# Patient Record
Sex: FEMALE | URBAN - METROPOLITAN AREA
[De-identification: names, ages, dates, MRNs, and addresses within clinical notes are randomized per-mention and may not be internally consistent; named-entity substitution may affect disease eponyms.]

---

## 2017-01-05 ENCOUNTER — THERAPY VISIT (OUTPATIENT)
Dept: OCCUPATIONAL THERAPY | Facility: CLINIC | Age: 32
End: 2017-01-05
Payer: COMMERCIAL

## 2017-01-05 DIAGNOSIS — G56.03 BILATERAL CARPAL TUNNEL SYNDROME: Primary | ICD-10-CM

## 2017-01-05 DIAGNOSIS — M25.532 PAIN IN BOTH WRISTS: ICD-10-CM

## 2017-01-05 DIAGNOSIS — M25.531 PAIN IN BOTH WRISTS: ICD-10-CM

## 2017-01-05 PROCEDURE — 97112 NEUROMUSCULAR REEDUCATION: CPT | Mod: GO | Performed by: OCCUPATIONAL THERAPIST

## 2017-01-05 PROCEDURE — 97026 INFRARED THERAPY: CPT | Mod: GO | Performed by: OCCUPATIONAL THERAPIST

## 2017-01-05 PROCEDURE — 97140 MANUAL THERAPY 1/> REGIONS: CPT | Mod: GO | Performed by: OCCUPATIONAL THERAPIST

## 2017-01-05 NOTE — PROGRESS NOTES
SOAP note objective information for 1/5/2017    Please refer to the daily flowsheet for treatment today, total treatment time and time spent performing 1:1 timed codes.         Pain Level Report  VAS(0-10) 8/4/2016 9/15 9/29/16 10/27/16 11/3/16 11/8/16 11/25/16 11/29/16 12/6/2016    R wrist              At Rest: 8 6/10 12/10 5 2 3 3 3 4    With Use: 10++ 8/10 12/10 5 2 4 4 4 5    L wrist              At rest  8 5 8/10 0 1 1 1 1 2    With use  10 6 8/10 0 1 1-2 1-2 2 3      VAS(0-10) 12/13/16 12/23/16 12/30/16 1/5/2017        R wrist             At Rest: 5 4 5 5        With Use: 6 5 7 7-8        L wrist             At rest  3 2-3 3 3        With use  4 3-4 4 4            Report of Pain:  Location: R and L wrist  Pain Quality:  More on Right  Frequency: intermittent or constant    Pain is worst:  Daytime, has a a hard time falling asleep   Exacerbated by:  Typing and use and mousing   Relieved by:  cold, heat, rest and contrast baths   Progression: better, but slight exacerbation  Edema:  NONE (just feels Like there is and alien in my hand trying to get out)  Sensation:  Brief paraesthesias in tips of B ring fingers for one day, that resolved the same day.    ROM: WNL bilateral hands     Special Tests:  Date 8/4/2016 9/15 10/27/16 11/25/2016 12/23/16   Side        Phalens R: ++++, L + R ++++, L + NT     Tinels at CT R -, L - R-, L- NT     Tinels at Pronator R - L - R-, L- NT     Median Nerve ULTT R -, L - R-. : - NT     Paresthesias Yes in palm only R and L hands  In B palm and wrist only not fingers  None at this time     Jona   R: +  L:+ R: + at 10 sec  L:+ at 10 sec R: + at 20 sec  L:+ at 20 sec   Costoclavicular   R: +  L:+ R: +  L:+ R: +  L:+   Elbow flexion test   R: +  L:+ R: +  L:+ R: +  L:+               Strength:   (Measured in pounds)    8/4/2016 11/3/16      Trials Left  Right Left Right Left Right   1  2  3 15  15  20 10  10  7       Average: 17 9 15# 10#       Lat Pinch  8/4/2016 11/3/16      Trials  Left  Right  Left Right Left Right   1  2  3 5  5  5 6  6  6       Average: 5 6 4# 4#       3 Pt Pinch  8/4/2016 11/3/16      Trials Left  Right  Left Right Left Right   1  2  3 2  2  2 0  0  0       Average: 2 0 2# 2#       Posture: has forward head rounded shoulder posture       Home Exercise Program:  PTrx tendon and nerve gliding for CTS 3 times per day 10 reps  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 We discussed asking Dr. De Luna for complete typing and mousing restrictions, however patient wants to opt for waiting after her 2 week vacation which starts 7 work days from now before requesting the greater restrictions)  Went over anatomy and precautions in depth with patient   Patient has purchased her own night wrist splints   Patient is trying Rolfing on her own .   9/29/2016  Lower Keyboard so elbows are in line with keyboard not below the keyboard   Add upper, middle, lower trap strengthening   Towel roll stretches   10/27/2016  Foam roller  Small ball volar and dorsal fa and bicep/pronator  1st rib/sheet depression  1st rib trigger with tennis ball  Tennis ball to muscles under clavicle  Latissimus stretch  Pec stretch  Bridging #1  [place] massage CT/thenar;volar hand  Clip web/Double pencil eraser to web space  Shoulder blade squeezes  K-tape    Next Visit:  subscap stretch  Laser  MFR  Nerve gliding

## 2017-01-10 ENCOUNTER — OFFICE VISIT (OUTPATIENT)
Dept: FAMILY MEDICINE | Facility: CLINIC | Age: 32
End: 2017-01-10
Payer: COMMERCIAL

## 2017-01-10 ENCOUNTER — OFFICE VISIT (OUTPATIENT)
Dept: ORTHOPEDICS | Facility: CLINIC | Age: 32
End: 2017-01-10

## 2017-01-10 VITALS
DIASTOLIC BLOOD PRESSURE: 58 MMHG | BODY MASS INDEX: 19.99 KG/M2 | TEMPERATURE: 98 F | WEIGHT: 120 LBS | HEIGHT: 65 IN | OXYGEN SATURATION: 96 % | HEART RATE: 83 BPM | SYSTOLIC BLOOD PRESSURE: 93 MMHG

## 2017-01-10 VITALS — HEIGHT: 65 IN | WEIGHT: 117 LBS | BODY MASS INDEX: 19.49 KG/M2

## 2017-01-10 DIAGNOSIS — M25.511 CHRONIC PAIN OF BOTH SHOULDERS: ICD-10-CM

## 2017-01-10 DIAGNOSIS — M79.601 PAIN IN BOTH UPPER EXTREMITIES: ICD-10-CM

## 2017-01-10 DIAGNOSIS — M25.512 CHRONIC PAIN OF BOTH SHOULDERS: ICD-10-CM

## 2017-01-10 DIAGNOSIS — G89.29 CHRONIC PAIN OF BOTH SHOULDERS: ICD-10-CM
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
## 2017-01-10 DIAGNOSIS — Z00.00 ROUTINE GENERAL MEDICAL EXAMINATION AT A HEALTH CARE FACILITY: Primary | ICD-10-CM

## 2017-01-10 DIAGNOSIS — M79.602 PAIN IN BOTH UPPER EXTREMITIES: ICD-10-CM

## 2017-01-10 PROCEDURE — 82947 ASSAY GLUCOSE BLOOD QUANT: CPT | Performed by: INTERNAL MEDICINE

## 2017-01-10 PROCEDURE — 99385 PREV VISIT NEW AGE 18-39: CPT | Performed by: INTERNAL MEDICINE

## 2017-01-10 PROCEDURE — 80061 LIPID PANEL: CPT | Performed by: INTERNAL MEDICINE

## 2017-01-10 PROCEDURE — 36415 COLL VENOUS BLD VENIPUNCTURE: CPT | Performed by: INTERNAL MEDICINE

## 2017-01-10 RX ORDER — CHLORAL HYDRATE 500 MG
2 CAPSULE ORAL DAILY
COMMUNITY

## 2017-01-10 NOTE — PROGRESS NOTES
"Sports Medicine Clinic Visit    PCP: No primary care provider on file.    Fahad Joshua is a 32 year old female who is seen  in follow-up presenting with bilateral upper extremity pain.       Ht 5' 4.5\" (1.638 m)  Wt 117 lb (53.071 kg)  BMI 19.78 kg/m2       PMH:  No past medical history on file.    Active problem list:  Patient Active Problem List   Diagnosis     Bilateral carpal tunnel syndrome     Pain in both wrists       FH:  No family history on file.    SH:  Social History     Social History     Marital Status: Single     Spouse Name: N/A     Number of Children: N/A     Years of Education: N/A     Occupational History     Not on file.     Social History Main Topics     Smoking status: Not on file     Smokeless tobacco: Not on file     Alcohol Use: Not on file     Drug Use: Not on file     Sexual Activity: Not on file     Other Topics Concern     Not on file     Social History Narrative     No narrative on file       MEDS:  See EMR, reviewed  ALL:  See EMR, reviewed    REVIEW OF SYSTEMS:  CONSTITUTIONAL:NEGATIVE for fever, chills, change in weight  INTEGUMENTARY/SKIN: NEGATIVE for worrisome rashes, moles or lesions  EYES: NEGATIVE for vision changes or irritation  ENT/MOUTH: NEGATIVE for ear, mouth and throat problems  RESP:NEGATIVE for significant cough or SOB  BREAST: NEGATIVE for masses, tenderness or discharge  CV: NEGATIVE for chest pain, palpitations or peripheral edema  GI: NEGATIVE for nausea, abdominal pain, heartburn, or change in bowel habits  :NEGATIVE for frequency, dysuria, or hematuria  :NEGATIVE for frequency, dysuria, or hematuria  NEURO: NEGATIVE for weakness, dizziness or paresthesias  ENDOCRINE: NEGATIVE for temperature intolerance, skin/hair changes  HEME/ALLERGY/IMMUNE: NEGATIVE for bleeding problems  PSYCHIATRIC: NEGATIVE for changes in mood or affect          EXAM: MRI OF THE RIGHT SHOULDER  CLINICAL INFORMATION: The patient is a 31-year-old female with right shoulder pain. " Evaluate acromioclavicular joint. Evaluate for tear, degeneration, or acromioclavicular separation.  PRIOR SURGERY: None reported.  COMPARISON STUDIES: There are no prior studies available for comparison.  TECHNICAL INFORMATION: 1.5T MR scanner and a localizing shoulder surface coil:  3.0 mm coronal obliques: PD, T2, STIR  3.0 mm sagittal obliques: PD, T2  3.0 mm axials: PD, T2  FINDINGS:  Articular/Extraarticular collections:  Effusion: Minimal.  Subacromial/subdeltoid: Mild fluid is seen within the subacromial/subdeltoid bursa, in keeping with mild bursitis.  Subcoracoid: No evidence for bursitis.  Osseous structures:  Proximal humerus: No evidence for bony injury to the proximal humerus can be seen. There is no evidence for greater tuberosity fracture. No Hill-Sachs or reverse Hill-Sachs deformity is seen.  Glenoid: No acute bony abnormality of the glenoid fossa or glenoid neck can be seen.  Acromioclavicular joint: No evidence for injury to the acromioclavicular joint can be seen.  Coracoacromial arch:  Acromion morphology: Type II. No evidence for os acromiale.  Acromiohumeral space: At the lower limits of normal.  Coracohumeral space: Within normal limits.  Rotator cuff and deltoid:  Supraspinatus: Mild changes of supraspinatus tendinosis are present. There is no evidence for full or partial-thickness tearing. No atrophic changes of the supraspinatus muscle belly are seen.  Infraspinatus: Mild infraspinatus tendinosis can be seen without evidence for full or partial-thickness tearing. No atrophic changes of the infraspinatus muscle belly are seen.  Teres minor: No evidence for tendinosis, tearing, or associated muscle belly atrophy.  Subscapularis: No evidence for tendinosis, tearing, or associated muscle belly atrophy.  Deltoid: No evidence for strain or tearing.  Biceps tendon: The intra-articular and biceps sulcus portions of the biceps tendon are normal. There is no evidence for rupture, dislocation, or  subluxation.  Glenohumeral joint and labrum:  Articular Cartilage: No chondral injuries along the articular surfaces of the glenohumeral articulation can be seen. No osteoarthritic changes are identified.  Labrum: The anterior, posterior, superior, and inferior portions of the labrum appear intact. No evidence for paralabral ganglion cyst formation can be seen.  Capsular Soft Tissues: No definite capsular abnormalities of the glenohumeral joint are seen. No evidence for capsular tearing is present and there are no MR signs of adhesive capsulitis.  CONCLUSION:  1. Mild supraspinatus and infraspinatus tendinosis can be seen. There is no evidence for full or partial-thickness rotator cuff tearing.  2. No injuries to the acromioclavicular joint are present.  3. Mild subacromial/subdeltoid bursitis and minimal glenohumeral joint effusion.  4. The glenoid labrum and long head of the biceps tendon appear intact.  5. No osteoarthritic changes of the glenohumeral articulation are seen.  [facility]      Electronically signed on 12/30/2016 7:05:00 AM by Amaury Sanchez M.D.         EXAM: MRI OF THE LEFT SHOULDER  CLINICAL INFORMATION: The patient is a 31-year-old female with left shoulder pain. Evaluate for acromioclavicular joint abnormality. Evaluate for tear, degeneration, or acromioclavicular separation injury.  PRIOR SURGERY: None reported.  COMPARISON STUDIES: There are no prior studies available for comparison.  TECHNICAL INFORMATION: 1.5T MR scanner and a localizing shoulder surface coil:  3.0 mm coronal obliques: PD, T2, STIR  3.0 mm sagittal obliques: PD, T2  3.0 mm axials: PD, T2  FINDINGS:  Articular/Extraarticular collections:  Effusion: None.  Subacromial/subdeltoid: Minimal fluid is seen within the subacromial/subdeltoid bursa.  Subcoracoid: No evidence for bursitis.  Osseous structures:  Proximal humerus: No evidence for bony injury to the proximal humerus can be seen. There is no evidence for greater tuberosity  fracture. No Hill-Sachs or reverse Hill-Sachs deformity is seen.  Glenoid: No acute bony abnormality of the glenoid fossa or glenoid neck can be seen.  Acromioclavicular joint: No evidence for injury to the acromioclavicular joint can be seen.  Coracoacromial arch:  Acromion morphology: Type I. No evidence for os acromiale.  Acromiohumeral space: Mildly narrowed.  Coracohumeral space: Within normal limits.  Rotator cuff and deltoid:  Supraspinatus: No evidence for tendinosis, tearing, or associated muscle belly atrophy.  Infraspinatus: No evidence for tendinosis, tearing, or associated muscle belly atrophy.  Teres minor: No evidence for tendinosis, tearing, or associated muscle belly atrophy.  Subscapularis: No evidence for tendinosis, tearing, or associated muscle belly atrophy.  Deltoid: No evidence for strain or tearing.  Biceps tendon: The intra-articular and biceps sulcus portions of the biceps tendon are normal. There is no evidence for rupture, dislocation, or subluxation.  Glenohumeral joint and labrum:  Articular Cartilage: No chondral injuries along the articular surfaces of the glenohumeral articulation can be seen. No osteoarthritic changes are identified.  Labrum: The anterior, posterior, superior, and inferior portions of the labrum appear intact. No evidence for paralabral ganglion cyst formation can be seen.  Capsular Soft Tissues: No definite capsular abnormalities of the glenohumeral joint are seen. No evidence for capsular tearing is present and there are no MR signs of adhesive capsulitis.  CONCLUSION:  1. No evidence for injury to the acromioclavicular joint can be seen.  2. No injuries to the rotator cuff are identified.  3. Minimal subacromial/subdeltoid bursal fluid.  4. The glenoid labrum and long head of the biceps tendon appear intact.  5. No osteoarthritic changes of the glenohumeral articulation are seen.  [facility]      Electronically signed on 12/30/2016 7:07:00 AM by Amaury Sanchez  M.D.         EXAM: MR CERVICAL SPINE WITHOUT CONTRAST WITH EXTENSION  CLINICAL INFORMATION: Neck pain into the arms and hands for 5 months.  COMPARISON: None  TECHNICAL INFORMATION: This examination was performed on the 0.63T Open Upright MRI with neutral and extension sagittal T2 FSE, neutral axial MPGR and FSE T2, and neutral T1 sagittal images.  INTERPRETATION:  Fat suppressed images are negative for acute and subacute fractures. The cord is intrinsically normal. No high signal alteration or mass and craniovertebral junction structures are unremarkable. Positive vertebral artery flow.  C2-3: 1 mm retrolisthesis without stenosis or neural impingement. Facet joints are normal.  C3-4 and C4-5: 1 mm retrolisthesis, C3-4 annular fissure, no herniation or spinal stenosis. Facet joints are unremarkable.  C5-6: Disc bulge without central or foraminal stenosis. No herniation or facet arthropathy. Facet joints are unremarkable.  The C6-7, C7-T1 and T1-2 levels are negative for bulge, herniation or stenosis.  CONCLUSION:  1. No disc herniation, stenosis or neural compression.  2. C4-5 annular fissure and 1 mm retrolisthesis, and C5-6 disc bulge.  3. No fracture, infection or neoplasm.      Electronically signed on 11/12/2016 9:46:00 AM by Tani Ann M.D.       SUBJECTIVE:  This 32-year-old female is seen in followup for bilateral upper extremity pain.  Since last visit she has seen physicians and providers in other health systems.  She continues to get ongoing care from a physical therapist that she finds helpful.  She is making slow improvements and the physical therapist is hoping that she can make significant improvements within the next 2 months.  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  She will feel stress and tension in her upper back and towards her neck.  She has seen some relief with manipulations with chiropractor.  She has had a number of studies done by additional physicians including an MRI of the neck, an MRI of both shoulders, x-rays of the bilateral shoulders, x-rays of the bilateral wrists, x-rays of the thoracic spine and lumbar spine and pelvis since my last visit with her.  She has seen a rheumatologist.        LABORATORY:  X-rays of her bilateral shoulders are reviewed and I do not see any bony abnormalities.  The MRI reports of her cervical spine and shoulders are reviewed and there are no significant abnormalities.  She had some minor signs of tendinitis about the shoulders but without any findings that would explain her persistent upper extremity pain.  The MRI of the cervical spine showed no signs of impingement or stenosis, and this is consistent with the previous normal EMG that she had of her bilateral upper extremities.        She was told by her chiropractor that she should have a workup for thoracic outlet syndrome.  There are many parts of her story that do not sound like thoracic outlet.  It is also interesting that when she took 2 weeks off from work her symptoms disappeared completely.  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  She denies symptoms of depression and anxiety although we did go over these today.  She has tried to institute meditation and massage, in addition to the chiropractor care and physical therapy that she obtains.      OBJECTIVE:  She has no impingement signs at either shoulder today.   I do not palpate any masses about the clavicle.   I do not palpate any thrills about the clavicle.   A Jona test is essentially negative producing some tension that she feels in the bilateral shoulders although she will get  a tingling sensation at about a minute in the bilateral hands.  Adson maneuver is negative bilaterally.      ASSESSMENT:  Repetitive use syndrome of the bilateral upper extremities.      PLAN:  I reassured her about the findings of these multiple studies and also the findings of the rheumatologist that she saw who did not find, through a series of blood tests, diagnosis of underlying arthritis or other issues.   At the beginning of these symptoms she expressed her wish that she could find another part of her job that did not require so much repetitive typing and time at the keyboard, and I encouraged her to continue to think about exploring this.   She and her physical therapist have discussed this and they decided that it would be best for her to continue with the 4-hour shift at work and she believes she can handle this, and so a work note was given regarding this, limiting her shift to 4 hours over the next 4 weeks and avoiding excessive gripping and grasping and lifting greater than 20 pounds.  Will complete the workup with an x-ray to look for cervical rib and a dynamic ultrasound that is positional of the upper extremities to rule out a vascular variant of thoracic outlet, although I told her I think this diagnosis seems unlikely in this setting.  If this comes back normal, she would have had a complete MRI, x-ray and vascular workup and other than ongoing physical therapy and work modification and retraining, I would not have other studies that I would order in this setting.  She will follow up with me in 4 weeks.

## 2017-01-10 NOTE — Clinical Note
"  1/10/2017      RE: Fahad Joshua  [address] [place] [address]       Sports Medicine Clinic Visit    PCP: No primary care provider on file.    Fahad Joshua is a 32 year old female who is seen  in follow-up presenting with bilateral upper extremity pain.       Ht 5' 4.5\" (1.638 m)  Wt 117 lb (53.071 kg)  BMI 19.78 kg/m2       PMH:  No past medical history on file.    Active problem list:  Patient Active Problem List   Diagnosis     Bilateral carpal tunnel syndrome     Pain in both wrists       FH:  No family history on file.    SH:  Social History     Social History     Marital Status: Single     Spouse Name: N/A     Number of Children: N/A     Years of Education: N/A     Occupational History     Not on file.     Social History Main Topics     Smoking status: Not on file     Smokeless tobacco: Not on file     Alcohol Use: Not on file     Drug Use: Not on file     Sexual Activity: Not on file     Other Topics Concern     Not on file     Social History Narrative     No narrative on file       MEDS:  See EMR, reviewed  ALL:  See EMR, reviewed    REVIEW OF SYSTEMS:  CONSTITUTIONAL:NEGATIVE for fever, chills, change in weight  INTEGUMENTARY/SKIN: NEGATIVE for worrisome rashes, moles or lesions  EYES: NEGATIVE for vision changes or irritation  ENT/MOUTH: NEGATIVE for ear, mouth and throat problems  RESP:NEGATIVE for significant cough or SOB  BREAST: NEGATIVE for masses, tenderness or discharge  CV: NEGATIVE for chest pain, palpitations or peripheral edema  GI: NEGATIVE for nausea, abdominal pain, heartburn, or change in bowel habits  :NEGATIVE for frequency, dysuria, or hematuria  :NEGATIVE for frequency, dysuria, or hematuria  NEURO: NEGATIVE for weakness, dizziness or paresthesias  ENDOCRINE: NEGATIVE for temperature intolerance, skin/hair changes  HEME/ALLERGY/IMMUNE: NEGATIVE for bleeding problems  PSYCHIATRIC: NEGATIVE for changes in mood or affect          EXAM: MRI OF THE RIGHT SHOULDER  CLINICAL " INFORMATION: The patient is a 31-year-old female with right shoulder pain. Evaluate acromioclavicular joint. Evaluate for tear, degeneration, or acromioclavicular separation.  PRIOR SURGERY: None reported.  COMPARISON STUDIES: There are no prior studies available for comparison.  TECHNICAL INFORMATION: 1.5T MR scanner and a localizing shoulder surface coil:  3.0 mm coronal obliques: PD, T2, STIR  3.0 mm sagittal obliques: PD, T2  3.0 mm axials: PD, T2  FINDINGS:  Articular/Extraarticular collections:  Effusion: Minimal.  Subacromial/subdeltoid: Mild fluid is seen within the subacromial/subdeltoid bursa, in keeping with mild bursitis.  Subcoracoid: No evidence for bursitis.  Osseous structures:  Proximal humerus: No evidence for bony injury to the proximal humerus can be seen. There is no evidence for greater tuberosity fracture. No Hill-Sachs or reverse Hill-Sachs deformity is seen.  Glenoid: No acute bony abnormality of the glenoid fossa or glenoid neck can be seen.  Acromioclavicular joint: No evidence for injury to the acromioclavicular joint can be seen.  Coracoacromial arch:  Acromion morphology: Type II. No evidence for os acromiale.  Acromiohumeral space: At the lower limits of normal.  Coracohumeral space: Within normal limits.  Rotator cuff and deltoid:  Supraspinatus: Mild changes of supraspinatus tendinosis are present. There is no evidence for full or partial-thickness tearing. No atrophic changes of the supraspinatus muscle belly are seen.  Infraspinatus: Mild infraspinatus tendinosis can be seen without evidence for full or partial-thickness tearing. No atrophic changes of the infraspinatus muscle belly are seen.  Teres minor: No evidence for tendinosis, tearing, or associated muscle belly atrophy.  Subscapularis: No evidence for tendinosis, tearing, or associated muscle belly atrophy.  Deltoid: No evidence for strain or tearing.  Biceps tendon: The intra-articular and biceps sulcus portions of the  biceps tendon are normal. There is no evidence for rupture, dislocation, or subluxation.  Glenohumeral joint and labrum:  Articular Cartilage: No chondral injuries along the articular surfaces of the glenohumeral articulation can be seen. No osteoarthritic changes are identified.  Labrum: The anterior, posterior, superior, and inferior portions of the labrum appear intact. No evidence for paralabral ganglion cyst formation can be seen.  Capsular Soft Tissues: No definite capsular abnormalities of the glenohumeral joint are seen. No evidence for capsular tearing is present and there are no MR signs of adhesive capsulitis.  CONCLUSION:  1. Mild supraspinatus and infraspinatus tendinosis can be seen. There is no evidence for full or partial-thickness rotator cuff tearing.  2. No injuries to the acromioclavicular joint are present.  3. Mild subacromial/subdeltoid bursitis and minimal glenohumeral joint effusion.  4. The glenoid labrum and long head of the biceps tendon appear intact.  5. No osteoarthritic changes of the glenohumeral articulation are seen.  AEC      Electronically signed on 12/30/2016 7:05:00 AM by Amaury Sanchez M.D.         EXAM: MRI OF THE LEFT SHOULDER  CLINICAL INFORMATION: The patient is a 31-year-old female with left shoulder pain. Evaluate for acromioclavicular joint abnormality. Evaluate for tear, degeneration, or acromioclavicular separation injury.  PRIOR SURGERY: None reported.  COMPARISON STUDIES: There are no prior studies available for comparison.  TECHNICAL INFORMATION: 1.5T MR scanner and a localizing shoulder surface coil:  3.0 mm coronal obliques: PD, T2, STIR  3.0 mm sagittal obliques: PD, T2  3.0 mm axials: PD, T2  FINDINGS:  Articular/Extraarticular collections:  Effusion: None.  Subacromial/subdeltoid: Minimal fluid is seen within the subacromial/subdeltoid bursa.  Subcoracoid: No evidence for bursitis.  Osseous structures:  Proximal humerus: No evidence for bony injury to  the proximal humerus can be seen. There is no evidence for greater tuberosity fracture. No Hill-Sachs or reverse Hill-Sachs deformity is seen.  Glenoid: No acute bony abnormality of the glenoid fossa or glenoid neck can be seen.  Acromioclavicular joint: No evidence for injury to the acromioclavicular joint can be seen.  Coracoacromial arch:  Acromion morphology: Type I. No evidence for os acromiale.  Acromiohumeral space: Mildly narrowed.  Coracohumeral space: Within normal limits.  Rotator cuff and deltoid:  Supraspinatus: No evidence for tendinosis, tearing, or associated muscle belly atrophy.  Infraspinatus: No evidence for tendinosis, tearing, or associated muscle belly atrophy.  Teres minor: No evidence for tendinosis, tearing, or associated muscle belly atrophy.  Subscapularis: No evidence for tendinosis, tearing, or associated muscle belly atrophy.  Deltoid: No evidence for strain or tearing.  Biceps tendon: The intra-articular and biceps sulcus portions of the biceps tendon are normal. There is no evidence for rupture, dislocation, or subluxation.  Glenohumeral joint and labrum:  Articular Cartilage: No chondral injuries along the articular surfaces of the glenohumeral articulation can be seen. No osteoarthritic changes are identified.  Labrum: The anterior, posterior, superior, and inferior portions of the labrum appear intact. No evidence for paralabral ganglion cyst formation can be seen.  Capsular Soft Tissues: No definite capsular abnormalities of the glenohumeral joint are seen. No evidence for capsular tearing is present and there are no MR signs of adhesive capsulitis.  CONCLUSION:  1. No evidence for injury to the acromioclavicular joint can be seen.  2. No injuries to the rotator cuff are identified.  3. Minimal subacromial/subdeltoid bursal fluid.  4. The glenoid labrum and long head of the biceps tendon appear intact.  5. No osteoarthritic changes of the glenohumeral articulation are  seen.  AEC      Electronically signed on 12/30/2016 7:07:00 AM by Amaury Sanchez M.D.         EXAM: MR CERVICAL SPINE WITHOUT CONTRAST WITH EXTENSION  CLINICAL INFORMATION: Neck pain into the arms and hands for 5 months.  COMPARISON: None  TECHNICAL INFORMATION: This examination was performed on the 0.63T Open Upright MRI with neutral and extension sagittal T2 FSE, neutral axial MPGR and FSE T2, and neutral T1 sagittal images.  INTERPRETATION:  Fat suppressed images are negative for acute and subacute fractures. The cord is intrinsically normal. No high signal alteration or mass and craniovertebral junction structures are unremarkable. Positive vertebral artery flow.  C2-3: 1 mm retrolisthesis without stenosis or neural impingement. Facet joints are normal.  C3-4 and C4-5: 1 mm retrolisthesis, C3-4 annular fissure, no herniation or spinal stenosis. Facet joints are unremarkable.  C5-6: Disc bulge without central or foraminal stenosis. No herniation or facet arthropathy. Facet joints are unremarkable.  The C6-7, C7-T1 and T1-2 levels are negative for bulge, herniation or stenosis.  CONCLUSION:  1. No disc herniation, stenosis or neural compression.  2. C4-5 annular fissure and 1 mm retrolisthesis, and C5-6 disc bulge.  3. No fracture, infection or neoplasm.      Electronically signed on 11/12/2016 9:46:00 AM by Tani Ann M.D.       SUBJECTIVE:  This 32-year-old female is seen in followup for bilateral upper extremity pain.  Since last visit she has seen physicians and providers in other health systems.  She continues to get ongoing care from a physical therapist that she finds helpful.  She is making slow improvements and the physical therapist is hoping that she can make significant improvements within the next 2 months.  She again notes that when she takes time off from work that her upper extremity symptoms will resolve, but when she returns to work for excessive typing and time at a desk she will have  burning pain that she feels in the bilateral upper extremities from the shoulders to the forearms, to the upper arms, even to the hands.  She will feel stress and tension in her upper back and towards her neck.  She has seen some relief with manipulations with chiropractor.  She has had a number of studies done by additional physicians including an MRI of the neck, an MRI of both shoulders, x-rays of the bilateral shoulders, x-rays of the bilateral wrists, x-rays of the thoracic spine and lumbar spine and pelvis since my last visit with her.  She has seen a rheumatologist.        LABORATORY:  X-rays of her bilateral shoulders are reviewed and I do not see any bony abnormalities.  The MRI reports of her cervical spine and shoulders are reviewed and there are no significant abnormalities.  She had some minor signs of tendinitis about the shoulders but without any findings that would explain her persistent upper extremity pain.  The MRI of the cervical spine showed no signs of impingement or stenosis, and this is consistent with the previous normal EMG that she had of her bilateral upper extremities.        She was told by her chiropractor that she should have a workup for thoracic outlet syndrome.  There are many parts of her story that do not sound like thoracic outlet.  It is also interesting that when she took 2 weeks off from work her symptoms disappeared completely.  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  She denies symptoms of depression and anxiety although we did go over these today.  She has tried to institute meditation and massage, in addition to the chiropractor care and physical therapy that she obtains.      OBJECTIVE:  She has no impingement signs at either shoulder today.   I do not palpate any masses about the clavicle.   I do not palpate any thrills about the clavicle.   A Jona test is essentially negative  producing some tension that she feels in the bilateral shoulders although she will get a tingling sensation at about a minute in the bilateral hands.  Adson maneuver is negative bilaterally.      ASSESSMENT:  Repetitive use syndrome of the bilateral upper extremities.      PLAN:  I reassured her about the findings of these multiple studies and also the findings of the rheumatologist that she saw who did not find, through a series of blood tests, diagnosis of underlying arthritis or other issues.   At the beginning of these symptoms she expressed her wish that she could find another part of her job that did not require so much repetitive typing and time at the keyboard, and I encouraged her to continue to think about exploring this.   She and her physical therapist have discussed this and they decided that it would be best for her to continue with the 4-hour shift at work and she believes she can handle this, and so a work note was given regarding this, limiting her shift to 4 hours over the next 4 weeks and avoiding excessive gripping and grasping and lifting greater than 20 pounds.  Will complete the workup with an x-ray to look for cervical rib and a dynamic ultrasound that is positional of the upper extremities to rule out a vascular variant of thoracic outlet, although I told her I think this diagnosis seems unlikely in this setting.  If this comes back normal, she would have had a complete MRI, x-ray and vascular workup and other than ongoing physical therapy and work modification and retraining, I would not have other studies that I would order in this setting.  She will follow up with me in 4 weeks.                             Rogers De Luna MD

## 2017-01-10 NOTE — MR AVS SNAPSHOT
After Visit Summary   1/10/2017    Fhaad Joshua    MRN: [id number]           Patient Information     Date Of Birth          1985        Visit Information        Provider Department      1/10/2017 6:30 PM Naldo Narvaez MD [facility]        Today's Diagnoses     Routine general medical examination at a health care facility    -  1     Chronic pain of both shoulders           Care Instructions      Preventive Health Recommendations  Female Ages 26 - 39  Yearly exam:   See your health care provider every year in order to    Review health changes.     Discuss preventive care.      Review your medicines if you your doctor has prescribed any.    Until age 30: Get a Pap test every three years (more often if you have had an abnormal result).    After age 30: Talk to your doctor about whether you should have a Pap test every 3 years or have a Pap test with HPV screening every 5 years.   You do not need a Pap test if your uterus was removed (hysterectomy) and you have not had cancer.  You should be tested each year for STDs (sexually transmitted diseases), if you're at risk.   Talk to your provider about how often to have your cholesterol checked.  If you are at risk for diabetes, you should have a diabetes test (fasting glucose).  Shots: Get a flu shot each year. Get a tetanus shot every 10 years.   Nutrition:     Eat at least 5 servings of fruits and vegetables each day.    Eat whole-grain bread, whole-wheat pasta and brown rice instead of white grains and rice.    Talk to your provider about Calcium and Vitamin D.     Lifestyle    Exercise at least 150 minutes a week (30 minutes a day, 5 days of the week). This will help you control your weight and prevent disease.    Limit alcohol to one drink per day.    No smoking.     Wear sunscreen to prevent skin cancer.    See your dentist every six months for an exam and cleaning.      (Z00.00) Routine general medical examination at a health  care facility  (primary encounter diagnosis)  Comment: For routine exam, we will draw labs as ordered, cholesterol, diabetes mellitus check,  We will also update vaccination history.  Plan: Glucose, Lipid panel reflex to direct LDL            (M25.512,  G89.29,  M25.511) Chronic pain of both shoulders  Comment: Plan to follow up in orthopedics, rheumatology, physical therapy, chiropractor, massage therapy, yoga, acupuncture.  Plan:             Follow-ups after your visit        Your next 10 appointments already scheduled     Jan 11, 2017  9:50 AM   PHYSICAL with Medina Padgett MD   [facility] [place] ([facility])    [address]  [facility] [address]   [phone]            Jan 12, 2017  8:30 AM   SMILEY Hand with Cydney Perla   [facility]    [address]  [facility] [address]   [phone]            Jan 19, 2017  7:30 AM   SMILEY Hand with Cydney Perla   [facility] ([place] [facility])    [address]  [facility] [address]   [phone]            Jan 26, 2017  3:00 PM   US UPPER EXTREMITY ARTERIAL THORACIC OUTLET SYNDROME with [id number]   [facility] ([facility])    [address]  1st Floor  [facility] [id number] [phone]           Please bring a list of your medicines (including vitamins, minerals and over-the-counter drugs). Also, tell your doctor about any allergies you may have. Wear comfortable clothes and leave your valuables at home.  You do not need to do anything special to prepare for your exam.  Please call the Imaging Department at your exam site with any questions.            Jan 26, 2017  4:00 PM   XR CHEST 2 VIEWS with [id number]   [facility] ([facility])    [address]  4th Floor  [facility] [place] [facility] [address] [phone]           Please bring a list of your  current medicines to your exam. (Include vitamins, minerals and over-thecounter medicines.) Leave your valuables at home.  Tell your doctor if there is a chance you may be pregnant.  You do not need to do anything special for this exam.            Feb 09, 2017  4:30 PM   (Arrive by 4:15 PM)   Return Visit with Rogers De Luna MD   [facility] ([facility])    [address]  [address] [facility] [id number] [phone]              Future tests that were ordered for you today     Open Future Orders        Priority Expected Expires Ordered    US Up Ext Arterial Thor Outlet Syn Routine  1/10/2018 1/10/2017    X-ray Chest 2 vws* Routine 1/10/2017 1/10/2018 1/10/2017            Who to contact     If you have questions or need follow up information about today's clinic visit or your schedule please contact [facility] directly at [phone].  Normal or non-critical lab and imaging results will be communicated to you by Remedifyhart, letter or phone within 4 business days after the clinic has received the results. If you do not hear from us within 7 days, please contact the clinic through [employer] or phone. If you have a critical or abnormal lab result, we will notify you by phone as soon as possible.  Submit refill requests through [employer]OTMP or call your pharmacy and they will forward the refill request to us. Please allow 3 business days for your refill to be completed.          Additional Information About Your Visit        Remed[employer]force Information     [employer] gives you secure access to your electronic health record. If you see a primary care provider, you can also send messages to your care team and make appointments. If you have questions, please call your primary care clinic.  If you do not have a primary care provider, please call [phone] and they will assist you.        Care EveryWhere ID     This is your Care EveryWhere ID. This could be used by other  "organizations to access your [place] medical records  [id number]        Your Vitals Were     Pulse Temperature Height BMI (Body Mass Index) Pulse Oximetry Last Period    83 98  F (36.7  C) 5' 4.5\" (1.638 m) 20.29 kg/m2 96% 12/25/2016    Breastfeeding?                   No            Blood Pressure from Last 3 Encounters:   01/10/17 93/58    Weight from Last 3 Encounters:   01/10/17 120 lb (54.432 kg)   01/10/17 117 lb (53.071 kg)   12/01/16 117 lb (53.071 kg)              We Performed the Following     Glucose     Lipid panel reflex to direct LDL        Primary Care Provider    None Specified       No primary provider on file.        Thank you!     Thank you for choosing [facility]  for your care. Our goal is always to provide you with excellent care. Hearing back from our patients is one way we can continue to improve our services. Please take a few minutes to complete the written survey that you may receive in the mail after your visit with us. Thank you!             Your Updated Medication List - Protect others around you: Learn how to safely use, store and throw away your medicines at www.disposemymeds.org.          This list is accurate as of: 1/10/17  7:05 PM.  Always use your most recent med list.                   Brand Name Dispense Instructions for use    cholecalciferol 1000 UNITS Tabs          fish oil-omega-3 fatty acids 1000 MG capsule      Take 2 g by mouth daily       magnesium 100 MG Caps          MULTIVITAMIN ADULT PO          PRO-BIOTIC BLEND PO            "

## 2017-01-10 NOTE — Clinical Note
[facility]  [address] [facility] [address]  [place] [address]  [phone]                                                                                               Date: 1/12/2017    Fahad Joshua                                                                               [address] [place] [address]              Dear Fahad,    The results of your recent tests were within acceptable limits. Enclosed is a copy of your results.      It was a pleasure to see you at your last appointment. If you have any questions, please feel free to call myself or my nurse at [phone].          Sincerely,    Naldo Narvaez MD/Cheyanne WILLOUGHBY CMA    Results for orders placed or performed in visit on 01/10/17   Glucose   Result Value Ref Range    Glucose 95 70 - 99 mg/dL   Lipid panel reflex to direct LDL   Result Value Ref Range    Cholesterol 219 (H) <200 mg/dL    Triglycerides 68 <150 mg/dL    HDL Cholesterol 84 >49 mg/dL    LDL Cholesterol Calculated 121 (H) <100 mg/dL    Non HDL Cholesterol 135 (H) <130 mg/dL

## 2017-01-10 NOTE — Clinical Note
[facility] SPORTS [facility]ICINE  [address]  [address] [facility] [address]  Phone: [phone]  Fax: [phone]           1/10/2017    Fahad Joshua  [address] [place] [id number] [phone] (home)     :     1985      To Whom it May Concern:    This patient is continuing to see slow improvements in her bilateral upper extremity pain in the setting of her repetitive use syndrome, with the assistance of ongoing physical therapy and chiropractor care.  From 15 to 15 she will continue to limit herself to a 4-hour shift at work, avoiding repetitive gripping and grasping and not lifting greater than 20 pounds.  She will be seen again in clinic in four weeks.    Please contact me for questions or concerns.    Sincerely,        Rogers De Luna MD

## 2017-01-10 NOTE — Clinical Note
Date:January 20, 2017      Patient was self referred, no letter generated. Do not send.        [facility] Information

## 2017-01-11 ENCOUNTER — TELEPHONE (OUTPATIENT)
Dept: NURSING | Facility: CLINIC | Age: 32
End: 2017-01-11

## 2017-01-11 ENCOUNTER — OFFICE VISIT (OUTPATIENT)
Dept: OBGYN | Facility: CLINIC | Age: 32
End: 2017-01-11
Payer: COMMERCIAL

## 2017-01-11 VITALS — SYSTOLIC BLOOD PRESSURE: 108 MMHG | DIASTOLIC BLOOD PRESSURE: 70 MMHG

## 2017-01-11 DIAGNOSIS — Z11.4 SCREENING FOR HUMAN IMMUNODEFICIENCY VIRUS: ICD-10-CM

## 2017-01-11 DIAGNOSIS — Z01.419 ENCOUNTER FOR GYNECOLOGICAL EXAMINATION WITHOUT ABNORMAL FINDING: Primary | ICD-10-CM

## 2017-01-11 DIAGNOSIS — Z11.3 SCREEN FOR STD (SEXUALLY TRANSMITTED DISEASE): ICD-10-CM

## 2017-01-11 DIAGNOSIS — Z11.8 SCREENING FOR CHLAMYDIAL DISEASE: ICD-10-CM

## 2017-01-11 LAB
CHOLEST SERPL-MCNC: 219 MG/DL
GLUCOSE SERPL-MCNC: 95 MG/DL (ref 70–99)
HDLC SERPL-MCNC: 84 MG/DL
LDLC SERPL CALC-MCNC: 121 MG/DL
NONHDLC SERPL-MCNC: 135 MG/DL
TRIGL SERPL-MCNC: 68 MG/DL

## 2017-01-11 PROCEDURE — G0145 SCR C/V CYTO,THINLAYER,RESCR: HCPCS | Performed by: OBSTETRICS & GYNECOLOGY

## 2017-01-11 PROCEDURE — 87624 HPV HI-RISK TYP POOLED RSLT: CPT | Performed by: OBSTETRICS & GYNECOLOGY

## 2017-01-11 PROCEDURE — 36415 COLL VENOUS BLD VENIPUNCTURE: CPT | Performed by: OBSTETRICS & GYNECOLOGY

## 2017-01-11 PROCEDURE — 86696 HERPES SIMPLEX TYPE 2 TEST: CPT | Performed by: OBSTETRICS & GYNECOLOGY

## 2017-01-11 PROCEDURE — 87591 N.GONORRHOEAE DNA AMP PROB: CPT | Performed by: OBSTETRICS & GYNECOLOGY

## 2017-01-11 PROCEDURE — 87389 HIV-1 AG W/HIV-1&-2 AB AG IA: CPT | Performed by: OBSTETRICS & GYNECOLOGY

## 2017-01-11 PROCEDURE — 86780 TREPONEMA PALLIDUM: CPT | Performed by: OBSTETRICS & GYNECOLOGY

## 2017-01-11 PROCEDURE — 86695 HERPES SIMPLEX TYPE 1 TEST: CPT | Performed by: OBSTETRICS & GYNECOLOGY

## 2017-01-11 PROCEDURE — 87491 CHLMYD TRACH DNA AMP PROBE: CPT | Performed by: OBSTETRICS & GYNECOLOGY

## 2017-01-11 PROCEDURE — 99385 PREV VISIT NEW AGE 18-39: CPT | Performed by: OBSTETRICS & GYNECOLOGY

## 2017-01-11 ASSESSMENT — ANXIETY QUESTIONNAIRES
2. NOT BEING ABLE TO STOP OR CONTROL WORRYING: NOT AT ALL
GAD7 TOTAL SCORE: 0
6. BECOMING EASILY ANNOYED OR IRRITABLE: NOT AT ALL
1. FEELING NERVOUS, ANXIOUS, OR ON EDGE: NOT AT ALL
7. FEELING AFRAID AS IF SOMETHING AWFUL MIGHT HAPPEN: NOT AT ALL
5. BEING SO RESTLESS THAT IT IS HARD TO SIT STILL: NOT AT ALL
3. WORRYING TOO MUCH ABOUT DIFFERENT THINGS: NOT AT ALL
IF YOU CHECKED OFF ANY PROBLEMS ON THIS QUESTIONNAIRE, HOW DIFFICULT HAVE THESE PROBLEMS MADE IT FOR YOU TO DO YOUR WORK, TAKE CARE OF THINGS AT HOME, OR GET ALONG WITH OTHER PEOPLE: NOT DIFFICULT AT ALL

## 2017-01-11 ASSESSMENT — PATIENT HEALTH QUESTIONNAIRE - PHQ9: 5. POOR APPETITE OR OVEREATING: NOT AT ALL

## 2017-01-11 NOTE — PATIENT INSTRUCTIONS
Preventive Health Recommendations  Female Ages 26 - 39  Yearly exam:   See your health care provider every year in order to    Review health changes.     Discuss preventive care.      Review your medicines if you your doctor has prescribed any.    Until age 30: Get a Pap test every three years (more often if you have had an abnormal result).    After age 30: Talk to your doctor about whether you should have a Pap test every 3 years or have a Pap test with HPV screening every 5 years.   You do not need a Pap test if your uterus was removed (hysterectomy) and you have not had cancer.  You should be tested each year for STDs (sexually transmitted diseases), if you're at risk.   Talk to your provider about how often to have your cholesterol checked.  If you are at risk for diabetes, you should have a diabetes test (fasting glucose).  Shots: Get a flu shot each year. Get a tetanus shot every 10 years.   Nutrition:     Eat at least 5 servings of fruits and vegetables each day.    Eat whole-grain bread, whole-wheat pasta and brown rice instead of white grains and rice.    Talk to your provider about Calcium and Vitamin D.     Lifestyle    Exercise at least 150 minutes a week (30 minutes a day, 5 days of the week). This will help you control your weight and prevent disease.    Limit alcohol to one drink per day.    No smoking.     Wear sunscreen to prevent skin cancer.    See your dentist every six months for an exam and cleaning.      (Z00.00) Routine general medical examination at a health care facility  (primary encounter diagnosis)  Comment: For routine exam, we will draw labs as ordered, cholesterol, diabetes mellitus check,  We will also update vaccination history.  Plan: Glucose, Lipid panel reflex to direct LDL            (M25.512,  G89.29,  M25.511) Chronic pain of both shoulders  Comment: Plan to follow up in orthopedics, rheumatology, physical therapy, chiropractor, massage therapy, yoga, acupuncture.  Plan:

## 2017-01-11 NOTE — PROGRESS NOTES
Fahad is a 32 year old No obstetric history on file. female who presents for annual exam.     Besides routine health maintenance, she has no other health concerns today .    HPI:  The patient's PCP is Dr. Narvaez. Pt declined to have weight and height done today, states that she has been to so many doctors appointments that she knows what her weight and height are: wt-120lb, ht-5 4.5 per pt  Patient is dealing with chronic shoulder and hand pain, possible thoracic outlet   Having evaluation and PATIENT ongoing      GYNECOLOGIC HISTORY:    Patient's last menstrual period was 12/25/2016 (exact date).  Her current contraception method is: not sexually active.  She  reports that she has never smoked. She has never used smokeless tobacco.    Patient is not sexually active.  STD testing offered?  Accepted  Last PHQ-9 score on record =   PHQ-9 SCORE 1/11/2017   Total Score 0     Last GAD7 score on record =   FLAVIO-7 SCORE 1/11/2017   Total Score 0     Alcohol Score = 0    HEALTH MAINTENANCE:  Cholesterol: NA,  had biometric screening done yesterday  Last Mammo: NA, Result: not applicable, Next Mammo: NA   Pap: a few years ago, done at [facility], WNL per pt  Colonoscopy:  NA, Result: not applicable, Next Colonoscopy: NA years.  Dexa:  NA    Health maintenance updated:  yes    HISTORY:  Obstetric History     No data available          Patient Active Problem List   Diagnosis     Bilateral carpal tunnel syndrome     Pain in both wrists     Chronic pain of both shoulders     No past surgical history on file.   Social History   Substance Use Topics     Smoking status: Never Smoker      Smokeless tobacco: Never Used     Alcohol Use: No      Problem (# of Occurrences) Relation (Name,Age of Onset)    Family History Negative (1) Father            Current Outpatient Prescriptions   Medication Sig     fish oil-omega-3 fatty acids 1000 MG capsule Take 2 g by mouth daily     Probiotic Product (PRO-BIOTIC BLEND PO)      Multiple  Vitamins-Minerals (MULTIVITAMIN ADULT PO)      magnesium 100 MG CAPS      cholecalciferol 1000 UNITS TABS      No current facility-administered medications for this visit.     Allergies   Allergen Reactions     Gluten Meal GI Disturbance     Body Aches     No Clinical Screening - See Comments      Steroids cause itching, rash, GI upset     Wellbutrin [Bupropion]      seizure     Corticosteroids Hives, Itching, Nausea and Rash       Past medical, surgical, social and family histories were reviewed and updated in [facility].    ROS:   Genitourinary: Cramps  Musculoskeletal: Joint Pain    EXAM:  /70 mmHg  LMP 12/25/2016 (Exact Date)  Breastfeeding? No   BMI: There is no weight on file to calculate BMI.    PHYSICAL EXAM:  Constitutional:  Appearance: Well nourished, well developed, alert, in no acute distress  Neck:  Lymph Nodes:  No lymphadenopathy present    Thyroid:  Gland size normal, nontender, no nodules or masses present  on palpation  Chest:  Respiratory Effort:  Breathing unlabored  Cardiovascular:    Heart: Auscultation:  Regular rate, normal rhythm, no murmurs present  Breasts: Inspection of Breasts:  No lymphadenopathy present    Palpation of Breasts and Axillae:  No masses present on palpation, no  breast tenderness    Axillary Lymph Nodes:  No lymphadenopathy present  Gastrointestinal:   Abdominal Examination:  Abdomen nontender to palpation, tone normal without rigidity or guarding, no masses present, umbilicus without lesions   Liver and Spleen:  No hepatomegaly present, liver nontender to palpation    Hernias:  No hernias present  Lymphatic: Lymph Nodes:  No other lymphadenopathy present  Skin:  General Inspection:  No rashes present, no lesions present, no areas of  discoloration    Genitalia and Groin:  No rashes present, no lesions present, no areas of  discoloration, no masses present  Neurologic/Psychiatric:    Mental Status:  Oriented X3     Pelvic Exam:  External Genitalia:     Normal  appearance for age, no discharge present, no tenderness present, no inflammatory lesions present, color normal  Vagina:     Normal vaginal vault without central or paravaginal defects, no discharge present, no inflammatory lesions present, no masses present  Bladder:     Nontender to palpation  Urethra:   Urethral Body:  Urethra palpation normal, urethra structural support normal   Urethral Meatus:  No erythema or lesions present  Cervix:     Appearance healthy, no lesions present, nontender to palpation, no bleeding present  Uterus:     Nontender to palpation, no masses present, position anteflexed, mobility: normal  Adnexa:     No adnexal tenderness present, no adnexal masses present  Perineum:     Perineum within normal limits, no evidence of trauma, no rashes or skin lesions present  Anus:     Anus within normal limits, no hemorrhoids present  Inguinal Lymph Nodes:     No lymphadenopathy present  Pubic Hair:     Normal pubic hair distribution for age  Genitalia and Groin:     No rashes present, no lesions present, no areas of discoloration, no masses present    COUNSELING:   Reviewed preventive health counseling, as reflected in patient instructions       Regular exercise       Healthy diet/nutrition    BMI: There is no weight on file to calculate BMI.      ASSESSMENT:  32 year old female with satisfactory annual exam.    ICD-10-CM    1. Encounter for gynecological examination without abnormal finding [Z01.419] Z01.419 Pap imaged thin layer screen with HPV - recommended age 30 - 65     HPV High Risk Types DNA Cervical   2. Screen for STD (sexually transmitted disease) Z11.3 NEISSERIA GONORRHOEA PCR     Anti Treponema     Herpes Simplex Virus 1 and 2 IgG   3. Screening for chlamydial disease Z11.8 CHLAMYDIA TRACHOMATIS PCR   4. Screening for human immunodeficiency virus Z11.4 HIV Antigen Antibody Combo       PLAN:  Return 1 yr    Medina Padgett MD

## 2017-01-11 NOTE — MR AVS SNAPSHOT
After Visit Summary   1/11/2017    Fahad Joshua    MRN: [id number]           Patient Information     Date Of Birth          1985        Visit Information        Provider Department      1/11/2017 9:50 AM Medina Padgett MD [facility] [place]        Today's Diagnoses     Encounter for gynecological examination without abnormal finding [Z01.419]    -  1     Screen for STD (sexually transmitted disease)         Screening for chlamydial disease         Screening for human immunodeficiency virus            Follow-ups after your visit        Your next 10 appointments already scheduled     Jan 12, 2017  8:30 AM   SMILEY Hand with Cydney Perla   [facility] ([facility])    [address] [address]   [phone]            Jan 19, 2017  7:30 AM   SMILEY Hand with Cydney Perla   [facility] ([facility])    [address] [address]   [phone]            Jan 26, 2017  3:00 PM   US UPPER EXTREMITY ARTERIAL THORACIC OUTLET SYNDROME with [id number]   [facility] ([facility])    [address]  1st Floor  [place] [id number] [phone]           Please bring a list of your medicines (including vitamins, minerals and over-the-counter drugs). Also, tell your doctor about any allergies you may have. Wear comfortable clothes and leave your valuables at home.  You do not need to do anything special to prepare for your exam.  Please call the Imaging Department at your exam site with any questions.            Jan 26, 2017  4:00 PM   XR CHEST 2 VIEWS with [id number]   [facility] ([facility] [place])    [address]  4th Floor  [place] [id number] [phone]           Please bring a list of your current medicines to your exam. (Include vitamins, minerals and over-thecounter medicines.) Leave your valuables at  home.  Tell your doctor if there is a chance you may be pregnant.  You do not need to do anything special for this exam.            Feb 09, 2017  4:30 PM   (Arrive by 4:15 PM)   Return Visit with Rogers De Luna MD   [facility] ([facility])    [address]  [address]th [facility] [id number] [phone]              Future tests that were ordered for you today     Open Future Orders        Priority Expected Expires Ordered    US Up Ext Arterial Thor Outlet Syn Routine  1/10/2018 1/10/2017    X-ray Chest 2 vws* Routine 1/10/2017 1/10/2018 1/10/2017            Who to contact     If you have questions or need follow up information about today's clinic visit or your schedule please contact [facility] directly at [phone].  Normal or non-critical lab and imaging results will be communicated to you by MyChart, letter or phone within 4 business days after the clinic has received the results. If you do not hear from us within 7 days, please contact the clinic through [employer] or phone. If you have a critical or abnormal lab result, we will notify you by phone as soon as possible.  Submit refill requests through [employer] or call your pharmacy and they will forward the refill request to us. Please allow 3 business days for your refill to be completed.          Additional Information About Your Visit        MyChart Information     [employer] gives you secure access to your electronic health record. If you see a primary care provider, you can also send messages to your care team and make appointments. If you have questions, please call your primary care clinic.  If you do not have a primary care provider, please call [phone] and they will assist you.        Care EveryWhere ID     This is your Care EveryWhere ID. This could be used by other organizations to access your [facility] medical records  [id number]        Your Vitals Were     Last Period  Breastfeeding?                12/25/2016 (Exact Date) No           Blood Pressure from Last 3 Encounters:   01/11/17 108/70   01/10/17 93/58    Weight from Last 3 Encounters:   01/10/17 120 lb (54.432 kg)   01/10/17 117 lb (53.071 kg)   12/01/16 117 lb (53.071 kg)              We Performed the Following     Anti Treponema     CHLAMYDIA TRACHOMATIS PCR     Herpes Simplex Virus 1 and 2 IgG     HIV Antigen Antibody Combo     HPV High Risk Types DNA Cervical     NEISSERIA GONORRHOEA PCR     Pap imaged thin layer screen with HPV - recommended age 30 - 65        Primary Care Provider    None Specified       No primary provider on file.        Thank you!     Thank you for choosing [facility]  for your care. Our goal is always to provide you with excellent care. Hearing back from our patients is one way we can continue to improve our services. Please take a few minutes to complete the written survey that you may receive in the mail after your visit with us. Thank you!             Your Updated Medication List - Protect others around you: Learn how to safely use, store and throw away your medicines at www.disposemymeds.org.          This list is accurate as of: 1/11/17 10:41 AM.  Always use your most recent med list.                   Brand Name Dispense Instructions for use    cholecalciferol 1000 UNITS Tabs          fish oil-omega-3 fatty acids 1000 MG capsule      Take 2 g by mouth daily       magnesium 100 MG Caps          MULTIVITAMIN ADULT PO          PRO-BIOTIC BLEND PO

## 2017-01-11 NOTE — PROGRESS NOTES
SUBJECTIVE:     CC: Fahad Joshua is an 32 year old woman who presents for preventive health visit.     Healthy Habits:    Do you get at least three servings of calcium containing foods daily (dairy, green leafy vegetables, etc.)? No        Amount of exercise or daily activities, outside of work: 1 day(s) per week    Problems taking medications regularly not applicable    Medication side effects: No    Have you had an eye exam in the past two years? yes    Do you see a dentist twice per year? yes  Do you have sleep apnea, excessive snoring or daytime drowsiness?no      Today's PHQ-2 Score: No flowsheet data found.    Abuse: Current or Past(Physical, Sexual or Emotional)- No  Do you feel safe in your environment - Yes    Social History   Substance Use Topics     Smoking status: Not on file     Smokeless tobacco: Not on file     Alcohol Use: Not on file     The patient does not drink >3 drinks per day nor >7 drinks per week.    No results for input(s): CHOL, HDL, LDL, TRIG, CHOLHDLRATIO, NHDL in the last 95077 hours.    Reviewed orders with patient.  Reviewed health maintenance and updated orders accordingly - Yes    Mammo Decision Support:  Mammogram not appropriate for this patient based on age.    Pertinent mammograms are reviewed under the imaging tab.  History of abnormal Pap smear: NO - age 30- 65 PAP every 3 years recommended  All Histories reviewed and updated in Epic.  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 Periods are regular.  M: 6 months of bilateral shoulder and arm pain - has been following with specialists  N: NEGATIVE for weakness, dizziness or paresthesias  P: NEGATIVE for changes in mood or affect    Problem list, Medication list, Allergies, and Medical/Social/Surgical histories reviewed in [facility] and updated as appropriate.  OBJECTIVE:     BP 93/58 mmHg  Pulse 83  Temp(Src) 98  F (36.7  C)  Ht 5' 4.5\" (1.638 m)  Wt 120 lb (54.432 kg)  BMI 20.29 kg/m2  SpO2 96%  LMP 12/25/2016  Breastfeeding? No  EXAM:  GENERAL: healthy, alert and no distress  EYES: Eyes grossly normal to inspection, PERRL and conjunctivae and sclerae normal  HENT: ear canals and TM's normal, nose and mouth without ulcers or lesions  NECK: no adenopathy, no asymmetry, masses, or scars and thyroid normal to palpation  RESP: lungs clear to auscultation - no rales, rhonchi or wheezes  CV: regular rate and rhythm, normal S1 S2, no S3 or S4, no murmur, click or rub, no peripheral edema and peripheral pulses strong  ABDOMEN: soft, nontender, no hepatosplenomegaly, no masses and bowel sounds normal  MS: no gross musculoskeletal defects noted, no edema  SKIN: no suspicious lesions or rashes  NEURO: Normal strength and tone, mentation intact and speech normal  PSYCH: mentation appears normal, affect normal/bright    ASSESSMENT/PLAN:       (Z00.00) Routine general medical examination at a health care facility  (primary encounter diagnosis)  Comment: For routine exam, we will draw labs as ordered, cholesterol, diabetes mellitus check,  We will also update vaccination history.  Plan: Glucose, Lipid panel reflex to direct LDL            (M25.512,  G89.29,  M25.511) Chronic pain of both shoulders  Comment: Plan to follow up in orthopedics, rheumatology, physical therapy, chiropractor, massage therapy, yoga, " "acupuncture.  Plan:               COUNSELING:   Reviewed preventive health counseling, as reflected in patient instructions         has no tobacco history on file.    Estimated body mass index is 19.78 kg/(m^2) as calculated from the following:    Height as of 12/1/16: 5' 4.5\" (1.638 m).    Weight as of an earlier encounter on 1/10/17: 117 lb (53.071 kg).       Counseling Resources:  ATP IV Guidelines  Pooled Cohorts Equation Calculator  Breast Cancer Risk Calculator  FRAX Risk Assessment  ICSI Preventive Guidelines  Dietary Guidelines for Americans, 2010  USDA's MyPlate  ASA Prophylaxis  Lung CA Screening    Naldo Narvaez MD, MD  [facility]  "

## 2017-01-11 NOTE — Clinical Note
January 11, 2017      Aungtricia Sotelowestleyar  [address] [place] [address]              To Whom It May Concern,    Aungtricia Tres was seen in our office today, 1/11/17 for an appointment. Please excuse her absence.     Sincerely,        Medina Padgett MD

## 2017-01-12 ENCOUNTER — THERAPY VISIT (OUTPATIENT)
Dept: OCCUPATIONAL THERAPY | Facility: CLINIC | Age: 32
End: 2017-01-12
Payer: COMMERCIAL

## 2017-01-12 DIAGNOSIS — M25.532 PAIN IN BOTH WRISTS: ICD-10-CM

## 2017-01-12 DIAGNOSIS — G56.03 BILATERAL CARPAL TUNNEL SYNDROME: Primary | ICD-10-CM

## 2017-01-12 DIAGNOSIS — M25.531 PAIN IN BOTH WRISTS: ICD-10-CM

## 2017-01-12 LAB
C TRACH DNA SPEC QL NAA+PROBE: NORMAL
HIV 1+2 AB+HIV1 P24 AG SERPL QL IA: NORMAL
HSV1 IGG SERPL QL IA: 7.5 AI (ref 0–0.8)
HSV2 IGG SERPL QL IA: ABNORMAL AI (ref 0–0.8)
N GONORRHOEA DNA SPEC QL NAA+PROBE: NORMAL
SPECIMEN SOURCE: NORMAL
SPECIMEN SOURCE: NORMAL
T PALLIDUM IGG+IGM SER QL: NEGATIVE

## 2017-01-12 PROCEDURE — 97026 INFRARED THERAPY: CPT | Mod: GO | Performed by: OCCUPATIONAL THERAPIST

## 2017-01-12 PROCEDURE — 97140 MANUAL THERAPY 1/> REGIONS: CPT | Mod: GO | Performed by: OCCUPATIONAL THERAPIST

## 2017-01-12 PROCEDURE — 97110 THERAPEUTIC EXERCISES: CPT | Mod: GO | Performed by: OCCUPATIONAL THERAPIST

## 2017-01-12 ASSESSMENT — PATIENT HEALTH QUESTIONNAIRE - PHQ9: SUM OF ALL RESPONSES TO PHQ QUESTIONS 1-9: 0

## 2017-01-12 ASSESSMENT — ANXIETY QUESTIONNAIRES: GAD7 TOTAL SCORE: 0

## 2017-01-12 NOTE — PROGRESS NOTES
Quick Note:    Can we fill out her form for insurance    Hi aFhad,    I had the opportunity to review your recent labs and a summary of your labs reads as follows:  Your fasting blood glucose returned within normal limits indicating no evidence of diabetes mellitus   Your fasting lipid panel show  - normal HDL (good) cholesterol -as your goal is greater than 40  - low LDL (bad) cholesterol as your goal is less than 160  - normal triglyceride levels    Congratulaions on your excellent results     Sincerely,  Naldo Narvaez MD  ______

## 2017-01-12 NOTE — PROGRESS NOTES
SOAP note objective information for 1/12/2017    Please refer to the daily flowsheet for treatment today, total treatment time and time spent performing 1:1 timed codes.         Pain Level Report  VAS(0-10) 8/4/2016 9/15 9/29/16 10/27/16 11/3/16 11/8/16 11/25/16 11/29/16 12/6/2016    R wrist              At Rest: 8 6/10 12/10 5 2 3 3 3 4    With Use: 10++ 8/10 12/10 5 2 4 4 4 5    L wrist              At rest  8 5 8/10 0 1 1 1 1 2    With use  10 6 8/10 0 1 1-2 1-2 2 3      VAS(0-10) 12/13/16 12/23/16 12/30/16 1/5/2017 1/12/17       R wrist             At Rest: 5 4 5 5 4       With Use: 6 5 7 7-8 6       L wrist             At rest  3 2-3 3 3 2       With use  4 3-4 4 4 3           Report of Pain:  Location: R and L wrist  Pain Quality:  More on Right  Frequency: intermittent or constant    Pain is worst:  Daytime, has a a hard time falling asleep   Exacerbated by:  Typing and use and mousing   Relieved by:  cold, heat, rest and contrast baths   Progression: better, but slight exacerbation  Edema:  NONE (just feels Like there is and alien in my hand trying to get out)  Sensation:  Brief paraesthesias in tips of B ring fingers for one day, that resolved the same day.    ROM: WNL bilateral hands     Special Tests:  Date 8/4/2016 9/15 10/27/16 11/25/2016 12/23/16   Side        Phalens R: ++++, L + R ++++, L + NT     Tinels at CT R -, L - R-, L- NT     Tinels at Pronator R - L - R-, L- NT     Median Nerve ULTT R -, L - R-. : - NT     Paresthesias Yes in palm only R and L hands  In B palm and wrist only not fingers  None at this time     Jona   R: +  L:+ R: + at 10 sec  L:+ at 10 sec R: + at 20 sec  L:+ at 20 sec   Costoclavicular   R: +  L:+ R: +  L:+ R: +  L:+   Elbow flexion test   R: +  L:+ R: +  L:+ R: +  L:+               Strength:   (Measured in pounds)    8/4/2016 11/3/16      Trials Left  Right Left Right Left Right   1  2  3 15  15  20 10  10  7       Average: 17 9 15# 10#       Lat Pinch  8/4/2016 11/3/16       Trials Left  Right  Left Right Left Right   1  2  3 5  5  5 6  6  6       Average: 5 6 4# 4#       3 Pt Pinch  8/4/2016 11/3/16      Trials Left  Right  Left Right Left Right   1  2  3 2  2  2 0  0  0       Average: 2 0 2# 2#       Posture: has forward head rounded shoulder posture       Home Exercise Program:  PTrx tendon and nerve gliding for CTS 3 times per day 10 reps  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 We discussed asking Dr. De Luna for complete typing and mousing restrictions, however patient wants to opt for waiting after her 2 week vacation which starts 7 work days from now before requesting the greater restrictions)  Went over anatomy and precautions in depth with patient   Patient has purchased her own night wrist splints   Patient is trying [place] on her own .   9/29/2016  Lower Keyboard so elbows are in line with keyboard not below the keyboard   Add upper, middle, lower trap strengthening   Towel roll stretches   10/27/2016  Foam roller  Small ball volar and dorsal fa and bicep/pronator  1st rib/sheet depression  1st rib trigger with tennis ball  Tennis ball to muscles under clavicle  Latissimus stretch  Pec stretch  Bridging #1  [place] massage CT/thenar;volar hand  Clip web/Double pencil eraser to web space  Shoulder blade squeezes  K-tape    Next Visit:  subscap stretch  Laser  MFR  Nerve gliding

## 2017-01-16 LAB
COPATH REPORT: NORMAL
PAP: NORMAL

## 2017-01-18 LAB
FINAL DIAGNOSIS: NORMAL
HPV HR 12 DNA CVX QL NAA+PROBE: NEGATIVE
HPV16 DNA SPEC QL NAA+PROBE: NEGATIVE
HPV18 DNA SPEC QL NAA+PROBE: NEGATIVE
SPECIMEN DESCRIPTION: NORMAL

## 2017-01-26 ENCOUNTER — OFFICE VISIT (OUTPATIENT)
Dept: ORTHOPEDICS | Facility: CLINIC | Age: 32
End: 2017-01-26

## 2017-01-26 DIAGNOSIS — M79.601 PAIN IN BOTH UPPER EXTREMITIES: Primary | ICD-10-CM

## 2017-01-26 DIAGNOSIS — M79.602 PAIN IN BOTH UPPER EXTREMITIES: Primary | ICD-10-CM

## 2017-01-26 NOTE — Clinical Note
Date:February 1, 2017      Patient was self referred, no letter generated. Do not send.        [facility] Information

## 2017-01-26 NOTE — Clinical Note
1/26/2017      RE: Fahad Joshua  [address] [place] [address]       January 26, 2017: Fahad Joshua is a 32 year old female who is seen in f/u up with bilateral UE discomfort, to go over u/s results    She notes recurrent burning pain that she feels in the bilateral upper extremities from the shoulders to the forearms, to the upper arms, even to the hands associated with her work.  She will also feel stress and tension in her upper back and towards her neck.        PMH:  Past Medical History   Diagnosis Date     Shoulder pain      follows w ortho and pt      MTHFR (methylene THF reductase) deficiency and homocystinuria (H)      heterozygote       Active problem list:  Patient Active Problem List   Diagnosis     Bilateral carpal tunnel syndrome     Pain in both wrists     Chronic pain of both shoulders       FH:  Family History   Problem Relation Age of Onset     Family History Negative Father        SH:  Social History     Social History     Marital Status: Single     Spouse Name: N/A     Number of Children: N/A     Years of Education: N/A     Occupational History     Not on file.     Social History Main Topics     Smoking status: Never Smoker      Smokeless tobacco: Never Used     Alcohol Use: No     Drug Use: No     Sexual Activity: No     Other Topics Concern     Not on file     Social History Narrative       MEDS:  See EMR, reviewed  ALL:  See EMR, reviewed    REVIEW OF SYSTEMS:  CONSTITUTIONAL:NEGATIVE for fever, chills, change in weight  INTEGUMENTARY/SKIN: NEGATIVE for worrisome rashes, moles or lesions  EYES: NEGATIVE for vision changes or irritation  ENT/MOUTH: NEGATIVE for ear, mouth and throat problems  RESP:NEGATIVE for significant cough or SOB  BREAST: NEGATIVE for masses, tenderness or discharge  CV: NEGATIVE for chest pain, palpitations or peripheral edema  GI: NEGATIVE for nausea, abdominal pain, heartburn, or change in bowel habits  :NEGATIVE for frequency, dysuria, or  hematuria  :NEGATIVE for frequency, dysuria, or hematuria  NEURO: NEGATIVE for weakness, dizziness or paresthesias  ENDOCRINE: NEGATIVE for temperature intolerance, skin/hair changes  HEME/ALLERGY/IMMUNE: NEGATIVE for bleeding problems  PSYCHIATRIC: NEGATIVE for changes in mood or affect      SUBJECTIVE:  This 32-year-old female is here to go over her ultrasound results.  Please see my previous dictation.  The ultrasound suggests the possibility of bilateral thoracic outlet syndrome although the results seem to me to be rather subtle and I do not know how to interpret them exactly.  I believe we have exhaustive the additional imaging studies of both her upper extremities and have tried various therapy options and time to see if this improves and yet her ongoing troubles persist.  In the past, Dr. Collado has seen people for thoracic [facility] now [facility].  I called the Thoracic Surgery Department and Vascular Surgery Department and they said the proper physician in this situation would be Dr. Chelsie Castaneda as a consultation to go over these results and her exam and see if she meets the criteria for thoracic outlet.  I did not see an obvious cervical rib on her chest x-ray.      ASSESSMENT:  Bilateral upper extremity discomfort with a history of repetitive use syndrome.      PLAN:  I did not examine her again today.  We went over the ultrasound results.  I will have her discuss this with Dr. Castaneda in the Vascular Surgery Department.                       Rogers De Luna MD

## 2017-01-26 NOTE — PROGRESS NOTES
January 26, 2017: Fahad Joshua is a 32 year old female who is seen in f/u up with bilateral UE discomfort, to go over u/s results    She notes recurrent burning pain that she feels in the bilateral upper extremities from the shoulders to the forearms, to the upper arms, even to the hands associated with her work.  She will also feel stress and tension in her upper back and towards her neck.        PMH:  Past Medical History   Diagnosis Date     Shoulder pain      follows w ortho and pt      MTHFR (methylene THF reductase) deficiency and homocystinuria (H)      heterozygote       Active problem list:  Patient Active Problem List   Diagnosis     Bilateral carpal tunnel syndrome     Pain in both wrists     Chronic pain of both shoulders       FH:  Family History   Problem Relation Age of Onset     Family History Negative Father        SH:  Social History     Social History     Marital Status: Single     Spouse Name: N/A     Number of Children: N/A     Years of Education: N/A     Occupational History     Not on file.     Social History Main Topics     Smoking status: Never Smoker      Smokeless tobacco: Never Used     Alcohol Use: No     Drug Use: No     Sexual Activity: No     Other Topics Concern     Not on file     Social History Narrative       MEDS:  See EMR, reviewed  ALL:  See EMR, reviewed    REVIEW OF SYSTEMS:  CONSTITUTIONAL:NEGATIVE for fever, chills, change in weight  INTEGUMENTARY/SKIN: NEGATIVE for worrisome rashes, moles or lesions  EYES: NEGATIVE for vision changes or irritation  ENT/MOUTH: NEGATIVE for ear, mouth and throat problems  RESP:NEGATIVE for significant cough or SOB  BREAST: NEGATIVE for masses, tenderness or discharge  CV: NEGATIVE for chest pain, palpitations or peripheral edema  GI: NEGATIVE for nausea, abdominal pain, heartburn, or change in bowel habits  :NEGATIVE for frequency, dysuria, or hematuria  :NEGATIVE for frequency, dysuria, or hematuria  NEURO: NEGATIVE for weakness,  dizziness or paresthesias  ENDOCRINE: NEGATIVE for temperature intolerance, skin/hair changes  HEME/ALLERGY/IMMUNE: NEGATIVE for bleeding problems  PSYCHIATRIC: NEGATIVE for changes in mood or affect      SUBJECTIVE:  This 32-year-old female is here to go over her ultrasound results.  Please see my previous dictation.  The ultrasound suggests the possibility of bilateral thoracic outlet syndrome although the results seem to me to be rather subtle and I do not know how to interpret them exactly.  I believe we have exhaustive the additional imaging studies of both her upper extremities and have tried various therapy options and time to see if this improves and yet her ongoing troubles persist.  In the past, Dr. Collado has seen people for thoracic outlet syndrome at the [facility] he is now [facility].  I called the Thoracic Surgery Department and Vascular Surgery Department and they said the proper physician in this situation would be Dr. Chelsie Castaneda as a consultation to go over these results and her exam and see if she meets the criteria for thoracic outlet.  I did not see an obvious cervical rib on her chest x-ray.      ASSESSMENT:  Bilateral upper extremity discomfort with a history of repetitive use syndrome.      PLAN:  I did not examine her again today.  We went over the ultrasound results.  I will have her discuss this with Dr. Castaneda in the Vascular Surgery Department.

## 2017-01-28 ENCOUNTER — MYC MEDICAL ADVICE (OUTPATIENT)
Dept: FAMILY MEDICINE | Facility: CLINIC | Age: 32
End: 2017-01-28

## 2017-01-28 DIAGNOSIS — Z01.83 BLOOD TYPING ENCOUNTER: Primary | ICD-10-CM

## 2017-02-02 ENCOUNTER — TELEPHONE (OUTPATIENT)
Dept: FAMILY MEDICINE | Facility: CLINIC | Age: 32
End: 2017-02-02

## 2017-02-03 NOTE — TELEPHONE ENCOUNTER
Informed patient that the lab was in her chart.  She can make an appointment for her lab.  Patient walked in to clinic prior to this encounter and was told that lab had not been entered.  Apologized and explained that an appt would be needed for processing of the sample and order.  At 7:00 pm we are not able to run this lab. Explained the processing etc and patient states understanding of this and made appt with .  Edna Chiu RN

## 2017-02-03 NOTE — TELEPHONE ENCOUNTER
Reason for Call: Request for an order or referral:    Order or referral being requested: lab    Date needed: as soon as possible    Has the patient been seen by the PCP for this problem? YES    Additional comments: per my chart conversation. The needs the order   For blood typing put in please.    Phone number Patient can be reached at:  Home number on file [phone] (home)    Best Time:  anytime    Can we leave a detailed message on this number?  YES    Call taken on 2/2/2017 at 6:51 PM by Mary Aparicio

## 2017-02-09 ENCOUNTER — THERAPY VISIT (OUTPATIENT)
Dept: OCCUPATIONAL THERAPY | Facility: CLINIC | Age: 32
End: 2017-02-09
Payer: COMMERCIAL

## 2017-02-09 DIAGNOSIS — G56.03 BILATERAL CARPAL TUNNEL SYNDROME: Primary | ICD-10-CM

## 2017-02-09 DIAGNOSIS — M25.531 PAIN IN BOTH WRISTS: ICD-10-CM

## 2017-02-09 DIAGNOSIS — M25.532 PAIN IN BOTH WRISTS: ICD-10-CM

## 2017-02-09 PROCEDURE — 97140 MANUAL THERAPY 1/> REGIONS: CPT | Mod: GO | Performed by: OCCUPATIONAL THERAPIST

## 2017-02-09 PROCEDURE — 97110 THERAPEUTIC EXERCISES: CPT | Mod: GO | Performed by: OCCUPATIONAL THERAPIST

## 2017-02-09 PROCEDURE — 97112 NEUROMUSCULAR REEDUCATION: CPT | Mod: GO | Performed by: OCCUPATIONAL THERAPIST

## 2017-02-09 PROCEDURE — 97035 APP MDLTY 1+ULTRASOUND EA 15: CPT | Mod: GO | Performed by: OCCUPATIONAL THERAPIST

## 2017-02-09 NOTE — PROGRESS NOTES
Progress Note - Hand Therapy    Current Date:  2/9/2017    Diagnosis: B hand CTS   DOI: 6/7/16 and has progressively gotten worse since then    Number of visits to date:  14    Reporting period is 12/23/2016 to 2/9/2017.    Subjectve:   Subjective changes as noted by patient: I had an US and x-ray that shows I have TOS on my left side.  My work did not approve part  Time, so I am working full time.  At the end of the work day, my shoulders were on fire and I needed a massage.  I am still having the pulling on my right  Side.  The low estrogen diet seems to be helping my pain.  My massage therapist thinks some of pain is coming from my neck.    Functional changes noted by patient:  Improvement in Self Care Tasks (dressing, eating, bathing)    Small Change to Work Tasks and some Household Chores    Patient has noted adverse reaction to:  None        Objective:  Pain Level Report  VAS(0-10) 8/4/2016 9/15 9/29/16 10/27/16 11/3/16 11/8/16 11/25/16 11/29/16 12/6/2016    R wrist              At Rest: 8 6/10 12/10 5 2 3 3 3 4    With Use: 10++ 8/10 12/10 5 2 4 4 4 5    L wrist              At rest  8 5 8/10 0 1 1 1 1 2    With use  10 6 8/10 0 1 1-2 1-2 2 3      VAS(0-10) 12/13/16 12/23/16 12/30/16 1/5/2017 1/12/17 2/9/2017      R wrist             At Rest: 5 4 5 5 4 3-4      With Use: 6 5 7 7-8 6 5-6      L wrist             At rest  3 2-3 3 3 2 1-2      With use  4 3-4 4 4 3 2-3          Report of Pain:  Location: R and L wrist  Pain Quality:  More on Right  Frequency: intermittent or constant    Pain is worst:  Daytime, has a a hard time falling asleep   Exacerbated by:  Typing and use and mousing   Relieved by:  cold, heat, rest and contrast baths   Progression: better, but slight exacerbation  Edema:  NONE (just feels Like there is and alien in my hand trying to get out)  Sensation:  Brief paraesthesias in tips of B ring fingers for one day, that resolved the same day.    ROM: WNL bilateral hands     Special  Tests:  Date 8/4/2016 9/15 10/27/16 11/25/2016 12/23/16   Side        Phalens R: ++++, L + R ++++, L + NT     Tinels at CT R -, L - R-, L- NT     Tinels at Pronator R - L - R-, L- NT     Median Nerve ULTT R -, L - R-. : - NT     Paresthesias Yes in palm only R and L hands  In B palm and wrist only not fingers  None at this time     Jona   R: +  L:+ R: + at 10 sec  L:+ at 10 sec R: + at 20 sec  L:+ at 20 sec   Costoclavicular   R: +  L:+ R: +  L:+ R: +  L:+   Elbow flexion test   R: +  L:+ R: +  L:+ R: +  L:+             Date 2/9/2017       Side        Phalens        Tinels at CT        Tinels at Pronator        Median Nerve ULTT        Paresthesias        Jona R: + at 30 sec  L:+ at 30 sec       Costoclavicular R: +  L:+       Elbow flexion test R: +  L:+                   Strength:   (Measured in pounds)    8/4/2016 11/3/16  2/9/2017    Trials Left  Right Left Right Left Right   1  2  3 15  15  20 10  10  7       Average: 17 9 15# 10# 18# 11#     Lat Pinch  8/4/2016 11/3/16  2/9/2017    Trials Left  Right  Left Right Left Right   1  2  3 5  5  5 6  6  6       Average: 5 6 4# 4# 5# 5#     3 Pt Pinch  8/4/2016 11/3/16  2/9/2017    Trials Left  Right  Left Right Left Right   1  2  3 2  2  2 0  0  0       Average: 2 0 2# 2# 4# 4#     Posture: has forward head rounded shoulder posture       Assessment:  Response to therapy has been improvement to:  Flexibility:  less tightness in involved muscles  Strength:   and pinch  Pain:  intensity of pain is decreased and less tender over affected area    Overall Assessment:  Patient's symptoms are resolving.  STG/LTG:  STGoals have been reviewed and some progress or achievement has occurred;  see goal sheet for details and updates.    Plan:  Frequency/Duration:  Recommend continuing to see patient  1 X week or every other, once daily  for 6 visits per MD  Appropriateness of Rx I have re-evaluated this patient and find that the nature, scope, duration and intensity of the  therapy is appropriate for the medical condition of the patient.    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 We discussed asking Dr. De Luna for complete typing and mousing restrictions, however patient wants to opt for waiting after her 2 week vacation which starts 7 work days from now before requesting the greater restrictions)  Went over anatomy and precautions in depth with patient   Patient has purchased her own night wrist splints   Patient is trying Rolfing on her own .   9/29/2016  Lower Keyboard so elbows are in line with keyboard not below the keyboard   Add upper, middle, lower trap strengthening   Towel roll stretches   10/27/2016  Foam roller  Small ball volar and dorsal fa and bicep/pronator  1st rib/sheet depression  1st rib trigger with tennis ball  Tennis ball to muscles under clavicle  Latissimus stretch  Pec stretch  Bridging #1  [place] massage CT/thenar;volar hand  Clip web/Double pencil eraser to web space  Shoulder blade squeezes  Scalene stretch with dycem  Flexor wad stretch with dycem  Rumble roller on flexor wad with arm extended    Next Visit:  subscap stretch  Laser  MFR  Nerve gliding

## 2017-02-16 ENCOUNTER — THERAPY VISIT (OUTPATIENT)
Dept: OCCUPATIONAL THERAPY | Facility: CLINIC | Age: 32
End: 2017-02-16
Payer: COMMERCIAL

## 2017-02-16 DIAGNOSIS — M25.531 PAIN IN BOTH WRISTS: ICD-10-CM

## 2017-02-16 DIAGNOSIS — M25.532 PAIN IN BOTH WRISTS: ICD-10-CM

## 2017-02-16 DIAGNOSIS — G56.03 BILATERAL CARPAL TUNNEL SYNDROME: ICD-10-CM

## 2017-02-16 PROCEDURE — 97035 APP MDLTY 1+ULTRASOUND EA 15: CPT | Mod: GO | Performed by: OCCUPATIONAL THERAPIST

## 2017-02-16 PROCEDURE — 97110 THERAPEUTIC EXERCISES: CPT | Mod: GO | Performed by: OCCUPATIONAL THERAPIST

## 2017-02-16 PROCEDURE — 97140 MANUAL THERAPY 1/> REGIONS: CPT | Mod: GO | Performed by: OCCUPATIONAL THERAPIST

## 2017-02-16 PROCEDURE — 97112 NEUROMUSCULAR REEDUCATION: CPT | Mod: GO | Performed by: OCCUPATIONAL THERAPIST

## 2017-02-16 NOTE — PROGRESS NOTES
SOAP note objective information for 2/16/2017.    Please refer to the daily flowsheet for treatment today, total treatment time and time spent performing 1:1 timed codes.         Pain Level Report  VAS(0-10) 8/4/2016 9/15 9/29/16 10/27/16 11/3/16 11/8/16 11/25/16 11/29/16 12/6/2016    R wrist              At Rest: 8 6/10 12/10 5 2 3 3 3 4    With Use: 10++ 8/10 12/10 5 2 4 4 4 5    L wrist              At rest  8 5 8/10 0 1 1 1 1 2    With use  10 6 8/10 0 1 1-2 1-2 2 3      VAS(0-10) 12/13/16 12/23/16 12/30/16 1/5/2017 1/12/17 2/9/2017 2/16/2017     R wrist             At Rest: 5 4 5 5 4 3-4 3-4     With Use: 6 5 7 7-8 6 5-6 5-6     L wrist             At rest  3 2-3 3 3 2 1-2 1-2     With use  4 3-4 4 4 3 2-3 2-3         Report of Pain:  Location: R and L wrist  Pain Quality:  More on Right  Frequency: intermittent or constant    Pain is worst:  Daytime, has a a hard time falling asleep   Exacerbated by:  Typing and use and mousing   Relieved by:  cold, heat, rest and contrast baths   Progression: better, but slight exacerbation  Edema:  NONE (just feels Like there is and alien in my hand trying to get out)  Sensation:  Brief paraesthesias in tips of B ring fingers for one day, that resolved the same day.    ROM: WNL bilateral hands     Special Tests:  Date 8/4/2016 9/15 10/27/16 11/25/2016 12/23/16   Side        Phalens R: ++++, L + R ++++, L + NT     Tinels at CT R -, L - R-, L- NT     Tinels at Pronator R - L - R-, L- NT     Median Nerve ULTT R -, L - R-. : - NT     Paresthesias Yes in palm only R and L hands  In B palm and wrist only not fingers  None at this time     Jona   R: +  L:+ R: + at 10 sec  L:+ at 10 sec R: + at 20 sec  L:+ at 20 sec   Costoclavicular   R: +  L:+ R: +  L:+ R: +  L:+   Elbow flexion test   R: +  L:+ R: +  L:+ R: +  L:+             Date 2/9/2017 2/16/2017      Side        Phalens        Tinels at CT        Tinels at Pronator        Ulnar Nerve ULTT  R:  At ~90 degrees, unable to  straighten fingers  L:  At ~ 90 degrees, fingers straight, wrist slightly extended      Median Nerve ULTT  R:  At ~90 degrees  L:  At ~ 90 degrees      Paresthesias        Jona R: + at 30 sec  L:+ at 30 sec R: + at 30 sec  L:+ at 30 sec      Costoclavicular R: +  L:+ NT      Elbow flexion test R: +  L:+ R: -  L:-                  Strength:   (Measured in pounds)    8/4/2016 11/3/16  2/9/2017    Trials Left  Right Left Right Left Right   1  2  3 15  15  20 10  10  7       Average: 17 9 15# 10# 18# 11#     Lat Pinch  8/4/2016 11/3/16  2/9/2017    Trials Left  Right  Left Right Left Right   1  2  3 5  5  5 6  6  6       Average: 5 6 4# 4# 5# 5#     3 Pt Pinch  8/4/2016 11/3/16  2/9/2017    Trials Left  Right  Left Right Left Right   1  2  3 2  2  2 0  0  0       Average: 2 0 2# 2# 4# 4#     Posture: has forward head rounded shoulder posture         Home Exercise Program:  PTrx tendon and nerve gliding for CTS 3 times per day 10 reps  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 We discussed asking Dr. De Luna for complete typing and mousing restrictions, however patient wants to opt for waiting after her 2 week vacation which starts 7 work days from now before requesting the greater restrictions)  Went over anatomy and precautions in depth with patient   Patient has purchased her own night wrist splints   Patient is trying Rolfing on her own .   9/29/2016  Lower Keyboard so elbows are in line with keyboard not below the keyboard   Add upper, middle, lower trap strengthening   Towel roll stretches   10/27/2016  Foam roller  Small ball volar and dorsal fa and bicep/pronator  1st rib/sheet depression  1st rib trigger with tennis ball  Tennis ball to muscles under clavicle  Latissimus stretch  Pec stretch  Bridging #1  [place] massage CT/thenar;volar  hand  Clip web/Double pencil eraser to web space  Shoulder blade squeezes  Scalene stretch with dycem  Flexor wad stretch with dycem  Rumble roller on flexor wad with arm extended  Ulnar glide    Next Visit:  subscap stretch  Laser  MFR  Nerve gliding

## 2017-02-16 NOTE — MR AVS SNAPSHOT
After Visit Summary   2/16/2017    Fahad Joshua    MRN: [id number]           Patient Information     Date Of Birth          1985        Visit Information        Provider Department      2/16/2017 7:30 AM Cydney Perla [facility]        Today's Diagnoses     Bilateral carpal tunnel syndrome        Pain in both wrists           Follow-ups after your visit        Your next 10 appointments already scheduled     Feb 23, 2017  7:30 AM CST   SMILEY Hand with Cydney Perla   [facility] ([facility])    [address] [place] [id number] [phone]              Who to contact     If you have questions or need follow up information about today's clinic visit or your schedule please contact [facility] directly at [phone].  Normal or non-critical lab and imaging results will be communicated to you by Kitwarehart, letter or phone within 4 business days after the clinic has received the results. If you do not hear from us within 7 days, please contact the clinic through Kitwarehart or phone. If you have a critical or abnormal lab result, we will notify you by phone as soon as possible.  Submit refill requests through Digium or call your pharmacy and they will forward the refill request to us. Please allow 3 business days for your refill to be completed.          Additional Information About Your Visit        MyChart Information     Digium gives you secure access to your electronic health record. If you see a primary care provider, you can also send messages to your care team and make appointments. If you have questions, please call your primary care clinic.  If you do not have a primary care provider, please call [phone] and they will assist you.        Care EveryWhere ID     This is your Care EveryWhere ID. This could be used by other organizations to access your [facility] medical records  [id number]         Blood Pressure from Last 3 Encounters:    01/11/17 108/70   01/10/17 93/58    Weight from Last 3 Encounters:   01/10/17 54.4 kg (120 lb)   01/10/17 53.1 kg (117 lb)   12/01/16 53.1 kg (117 lb)              We Performed the Following     MANUAL THER TECH,1+REGIONS,EA 15 MIN     NEUROMUSCULAR RE-EDUCATION     THERAPEUTIC EXERCISES     ULTRASOUND THERAPY        Primary Care Provider Office Phone # Fax #    Naldo Narvaez [phone]       [facility] [address]  [facility] [address]        Thank you!     Thank you for choosing [facility]  for your care. Our goal is always to provide you with excellent care. Hearing back from our patients is one way we can continue to improve our services. Please take a few minutes to complete the written survey that you may receive in the mail after your visit with us. Thank you!             Your Updated Medication List - Protect others around you: Learn how to safely use, store and throw away your medicines at www.disposemymeds.org.          This list is accurate as of: 2/16/17  8:33 AM.  Always use your most recent med list.                   Brand Name Dispense Instructions for use    cholecalciferol 1000 UNITS Tabs          fish oil-omega-3 fatty acids 1000 MG capsule      Take 2 g by mouth daily       magnesium 100 MG Caps          MULTIVITAMIN ADULT PO          PRO-BIOTIC BLEND PO

## 2017-06-23 DIAGNOSIS — R19.5 LOOSE STOOLS: ICD-10-CM

## 2017-06-23 DIAGNOSIS — R14.1 FLATULENCE, ERUCTATION, AND GAS PAIN: ICD-10-CM

## 2017-06-23 DIAGNOSIS — R14.2 FLATULENCE, ERUCTATION, AND GAS PAIN: ICD-10-CM

## 2017-06-23 DIAGNOSIS — R14.3 FLATULENCE, ERUCTATION, AND GAS PAIN: ICD-10-CM

## 2017-06-23 LAB
C DIFF TOX B STL QL: NORMAL
CAMPYLOBACTER GROUP BY NAT: NOT DETECTED
ENTERIC PATHOGEN COMMENT: NORMAL
NOROVIRUS I AND II BY NAT: NOT DETECTED
ROTAVIRUS A BY NAT: NOT DETECTED
SALMONELLA SPECIES BY NAT: NOT DETECTED
SHIGA TOXIN 1 GENE BY NAT: NOT DETECTED
SHIGA TOXIN 2 GENE BY NAT: NOT DETECTED
SHIGELLA SP+EIEC IPAH STL QL NAA+PROBE: NOT DETECTED
SPECIMEN SOURCE: NORMAL
VIBRIO GROUP BY NAT: NOT DETECTED
YERSINIA ENTEROCOLITICA BY NAT: NOT DETECTED

## 2017-06-23 PROCEDURE — 87506 IADNA-DNA/RNA PROBE TQ 6-11: CPT | Performed by: INTERNAL MEDICINE

## 2017-06-23 PROCEDURE — 87493 C DIFF AMPLIFIED PROBE: CPT | Mod: 59 | Performed by: INTERNAL MEDICINE

## 2017-06-23 PROCEDURE — 87177 OVA AND PARASITES SMEARS: CPT | Performed by: INTERNAL MEDICINE

## 2017-06-23 PROCEDURE — 87209 SMEAR COMPLEX STAIN: CPT | Performed by: INTERNAL MEDICINE

## 2017-06-25 NOTE — PROGRESS NOTES
Lester Vásquez,    I have had the opportunity to review your recent results and an interpretation is as follows:  All stool studies returned negative for enteric infection     Sincerely,  Naldo Narvaez MD

## 2017-06-26 LAB
MICRO REPORT STATUS: NORMAL
O+P STL MICRO: NORMAL
SPECIMEN SOURCE: NORMAL

## 2017-06-28 ENCOUNTER — TELEPHONE (OUTPATIENT)
Dept: FAMILY MEDICINE | Facility: CLINIC | Age: 32
End: 2017-06-28

## 2017-06-28 NOTE — TELEPHONE ENCOUNTER
Reason for Call:  Other call back    Detailed comments: The patient said she did not really want to get into it but that she wants to speak with a  Doctor or a nurse. She said it was pretty important and that it has a little something to do with her results    Phone Number Patient can be reached at: Home number on file [phone] (home)    Best Time: anytime    Can we leave a detailed message on this number? YES    Call taken on 6/28/2017 at 1:11 PM by Kristina Marinelli

## 2017-06-28 NOTE — TELEPHONE ENCOUNTER
"Returned call to patient.    Reviewed recent lab results.  Negative stool tests.     Patient took the required lab tests yesterday for new employer.  Results:  \"dilulted\".  Patient was advised to retake the lab test.    Patient asked about quantity of water she should drink prior to lab tests ordered by her new employer?  Advised patient to drink a \"normal\" amt of water daily---8-8 oz glasses should be OK.      Patient will go ahead with repeat lab test today or tomorrow.     Reina Ortiz RN, BSN    "

## 2019-11-03 ENCOUNTER — HEALTH MAINTENANCE LETTER (OUTPATIENT)
Age: 34
End: 2019-11-03

## 2020-11-16 ENCOUNTER — HEALTH MAINTENANCE LETTER (OUTPATIENT)
Age: 35
End: 2020-11-16

## 2021-09-18 ENCOUNTER — HEALTH MAINTENANCE LETTER (OUTPATIENT)
Age: 36
End: 2021-09-18

## 2022-01-08 ENCOUNTER — HEALTH MAINTENANCE LETTER (OUTPATIENT)
Age: 37
End: 2022-01-08

## 2022-11-19 ENCOUNTER — HEALTH MAINTENANCE LETTER (OUTPATIENT)
Age: 37
End: 2022-11-19

## 2023-04-15 ENCOUNTER — HEALTH MAINTENANCE LETTER (OUTPATIENT)
Age: 38
End: 2023-04-15